# Patient Record
Sex: MALE | Race: WHITE | NOT HISPANIC OR LATINO | Employment: FULL TIME | URBAN - NONMETROPOLITAN AREA
[De-identification: names, ages, dates, MRNs, and addresses within clinical notes are randomized per-mention and may not be internally consistent; named-entity substitution may affect disease eponyms.]

---

## 2022-12-16 ENCOUNTER — HOSPITAL ENCOUNTER (EMERGENCY)
Facility: HOSPITAL | Age: 37
Discharge: HOME | End: 2022-12-16
Attending: EMERGENCY MEDICINE

## 2022-12-16 ENCOUNTER — APPOINTMENT (EMERGENCY)
Dept: CT IMAGING | Facility: HOSPITAL | Age: 37
End: 2022-12-16

## 2022-12-16 ENCOUNTER — APPOINTMENT (EMERGENCY)
Dept: RADIOLOGY | Facility: HOSPITAL | Age: 37
End: 2022-12-16

## 2022-12-16 VITALS
OXYGEN SATURATION: 97 % | RESPIRATION RATE: 16 BRPM | TEMPERATURE: 97.7 F | HEART RATE: 78 BPM | SYSTOLIC BLOOD PRESSURE: 129 MMHG | DIASTOLIC BLOOD PRESSURE: 79 MMHG
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
## 2022-12-16 DIAGNOSIS — T14.8XXA MYALGIA, TRAUMATIC: ICD-10-CM

## 2022-12-16 DIAGNOSIS — S89.92XA LEFT KNEE INJURY: Primary | ICD-10-CM

## 2022-12-16 DIAGNOSIS — S09.90XA CLOSED HEAD INJURY: ICD-10-CM

## 2022-12-16 RX ORDER — IBUPROFEN 400 MG/1
400 TABLET ORAL ONCE
Status: COMPLETED | OUTPATIENT
Start: 2022-12-16 | End: 2022-12-16

## 2022-12-16 RX ORDER — ACETAMINOPHEN 325 MG/1
975 TABLET ORAL ONCE
Status: COMPLETED | OUTPATIENT
Start: 2022-12-16 | End: 2022-12-16

## 2022-12-16 RX ORDER — METHOCARBAMOL 500 MG/1
500 TABLET, FILM COATED ORAL EVERY 8 HOURS PRN
Qty: 20 TABLET | Refills: 0 | Status: SHIPPED | OUTPATIENT
Start: 2022-12-16

## 2022-12-16 RX ADMIN — ACETAMINOPHEN 975 MG: 325 TABLET ORAL at 17:47

## 2022-12-16 RX ADMIN — IBUPROFEN 400 MG: 400 TABLET ORAL at 17:47

## 2022-12-16 NOTE — Clinical Note
Brit Gallego was seen and treated in our emergency department on 12/16/2022     ?    ?    ? Diagnosis: Knee injury, headache    Jm Colon  may return to work on return date  He may return on this date: 12/19/2022    ? If you have any questions or concerns, please don't hesitate to call        Jaxon Moseley, DO    ______________________________           _______________          _______________  Hospital Representative                              Date                                Time
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
## 2023-07-16 ENCOUNTER — HOSPITAL ENCOUNTER (EMERGENCY)
Facility: HOSPITAL | Age: 38
Discharge: HOME | End: 2023-07-16
Attending: EMERGENCY MEDICINE | Admitting: EMERGENCY MEDICINE
Payer: COMMERCIAL

## 2023-07-16 VITALS
DIASTOLIC BLOOD PRESSURE: 98 MMHG | RESPIRATION RATE: 18 BRPM | TEMPERATURE: 98.5 F | HEIGHT: 72 IN | OXYGEN SATURATION: 98 % | WEIGHT: 250 LBS | SYSTOLIC BLOOD PRESSURE: 155 MMHG | BODY MASS INDEX: 33.86 KG/M2 | HEART RATE: 81 BPM

## 2023-07-16 DIAGNOSIS — A69.20 LYME DISEASE: Primary | ICD-10-CM

## 2023-07-16 PROCEDURE — 99282 EMERGENCY DEPT VISIT SF MDM: CPT

## 2023-07-16 PROCEDURE — 86618 LYME DISEASE ANTIBODY: CPT | Performed by: EMERGENCY MEDICINE

## 2023-07-16 PROCEDURE — 36415 COLL VENOUS BLD VENIPUNCTURE: CPT | Performed by: EMERGENCY MEDICINE

## 2023-07-16 RX ORDER — DOXYCYCLINE HYCLATE 100 MG/1
100 CAPSULE ORAL ONCE
Status: COMPLETED | OUTPATIENT
Start: 2023-07-16 | End: 2023-07-16

## 2023-07-16 RX ORDER — DOXYCYCLINE HYCLATE 100 MG/1
100 CAPSULE ORAL 2 TIMES DAILY
Qty: 42 CAPSULE | Refills: 0 | Status: SHIPPED | OUTPATIENT
Start: 2023-07-16 | End: 2023-08-06

## 2023-07-16 RX ADMIN — DOXYCYCLINE HYCLATE 100 MG: 100 CAPSULE ORAL at 15:02

## 2023-07-16 NOTE — ED PROVIDER NOTES
History  Chief Complaint   Patient presents with   • Rash     Pt states redness/ swelling started 1 week ago- started as a "bulls eye" now has spread, itchy, left arm is numb/tingly, penis feels like pins and needles. Patient is a 61-year-old male. He does have a history of prior tick bites. He presents to the emergency room complaining of a bull's-eye rash to the left inner upper arm. It started about a week ago. He became worse over the last 3 days. No fever or chills. No pain. No joint pain or swelling. Patient mentions some numbness to his penis. That is since resolved. He denies any penile discharge or lesions. No dysuria. No back pain. No history of diabetes. Prior to Admission Medications   Prescriptions Last Dose Informant Patient Reported? Taking? methocarbamol (ROBAXIN) 500 mg tablet   No No   Sig: Take 1 tablet (500 mg total) by mouth every 8 (eight) hours as needed for muscle spasms      Facility-Administered Medications: None       No past medical history on file. No past surgical history on file. No family history on file. I have reviewed and agree with the history as documented. E-Cigarette/Vaping     E-Cigarette/Vaping Substances     Social History     Tobacco Use   • Smoking status: Some Days     Types: Cigarettes   • Smokeless tobacco: Never   Substance Use Topics   • Alcohol use: Not Currently   • Drug use: Not Currently       Review of Systems   Constitutional: Negative for chills and fever. Musculoskeletal: Negative for arthralgias. Skin: Positive for rash. Physical Exam  Physical Exam  Vitals reviewed. Constitutional:       General: He is not in acute distress. HENT:      Head: Normocephalic and atraumatic. Nose: Nose normal.      Mouth/Throat:      Mouth: Mucous membranes are moist.   Eyes:      General:         Right eye: No discharge. Left eye: No discharge.       Conjunctiva/sclera: Conjunctivae normal.   Pulmonary: Effort: Pulmonary effort is normal. No respiratory distress. Musculoskeletal:         General: No deformity or signs of injury. Cervical back: Normal range of motion and neck supple. Skin:     General: Skin is warm and dry. Coloration: Skin is not pale. Findings: Rash present. Comments: There is a bull's-eye type rash to the inner aspect of the left upper arm. No induration. Neurological:      General: No focal deficit present. Mental Status: He is alert and oriented to person, place, and time. Psychiatric:         Mood and Affect: Mood normal.         Behavior: Behavior normal.         Vital Signs  ED Triage Vitals [07/16/23 1344]   Temperature Pulse Respirations Blood Pressure SpO2   98.5 °F (36.9 °C) 81 18 155/98 98 %      Temp Source Heart Rate Source Patient Position - Orthostatic VS BP Location FiO2 (%)   Temporal Monitor Sitting Right arm --      Pain Score       No Pain           Vitals:    07/16/23 1344   BP: 155/98   Pulse: 81   Patient Position - Orthostatic VS: Sitting         Visual Acuity      ED Medications  Medications   doxycycline hyclate (VIBRAMYCIN) capsule 100 mg (has no administration in time range)       Diagnostic Studies  Results Reviewed     Procedure Component Value Units Date/Time    Lyme Total AB W Reflex to IGM/IGG [[id number]]     Lab Status: No result Specimen: Blood                  No orders to display              Procedures  Procedures         ED Course                               SBIRT 22yo+    Flowsheet Row Most Recent Value   Initial Alcohol Screen: US AUDIT-C     1. How often do you have a drink containing alcohol? 0 Filed at: 07/16/2023 1342   2. How many drinks containing alcohol do you have on a typical day you are drinking? 0 Filed at: 07/16/2023 1342   3a. Male UNDER 65: How often do you have five or more drinks on one occasion? 0 Filed at: 07/16/2023 1342   3b. FEMALE Any Age, or MALE 65+:  How often do you have 4 or more drinks on one occassion? 0 Filed at: 07/16/2023 1342   Audit-C Score 0 Filed at: 07/16/2023 1342   SANDER: How many times in the past year have you. .. Used an illegal drug or used a prescription medication for non-medical reasons? Never Filed at: 07/16/2023 1342                    Medical Decision Making  The rash is suspicious for Lyme's. Cellulitis would be less likely. This is not syphilis. Etiology of the numbness to the penis unclear. It has resolved. Patient can follow-up with his primary MD for this. It does not sound like a UTI or an STD. Could be a nonspecific urethritis. Appropriate for discharge and outpatient management. Amount and/or Complexity of Data Reviewed  Labs: ordered. Risk  Prescription drug management. Decision regarding hospitalization. Disposition  Final diagnoses:   Lyme disease     Time reflects when diagnosis was documented in both MDM as applicable and the Disposition within this note     Time User Action Codes Description Comment    7/16/2023  2:46 PM Naina Francois Add [A69.20] Lyme disease       ED Disposition     ED Disposition   Discharge    Condition   Stable    Date/Time   Sun Jul 16, 2023  2:46 PM    Comment   Kuldip Armas discharge to home/self care. Follow-up Information     Follow up With Specialties Details Why Contact Info Additional Information    [facility] In 1 week  [address] [facility] [address] [phone] [facility], [address] [place], [place], [id number] [phone]          Patient's Medications   Discharge Prescriptions    DOXYCYCLINE HYCLATE (VIBRAMYCIN) 100 MG CAPSULE    Take 1 capsule (100 mg total) by mouth 2 (two) times a day for 21 days       Start Date: 7/16/2023 End Date: 8/6/2023       Order Dose: 100 mg       Quantity: 42 capsule    Refills: 0       No discharge procedures on file.     PDMP Review     None          ED Provider  Electronically Signed by           Harry Hagen MD  07/16/23 9894

## 2023-07-17 LAB — B BURGDOR IGG+IGM SER QL IA: NEGATIVE
